# Patient Record
Sex: FEMALE | Race: WHITE
[De-identification: names, ages, dates, MRNs, and addresses within clinical notes are randomized per-mention and may not be internally consistent; named-entity substitution may affect disease eponyms.]

---

## 2017-04-16 ENCOUNTER — HOSPITAL ENCOUNTER (EMERGENCY)
Dept: HOSPITAL 45 - C.EDB | Age: 57
Discharge: HOME | End: 2017-04-16
Payer: COMMERCIAL

## 2017-04-16 VITALS
HEIGHT: 60 IN | WEIGHT: 142.42 LBS | WEIGHT: 142.42 LBS | BODY MASS INDEX: 27.96 KG/M2 | HEIGHT: 60 IN | BODY MASS INDEX: 27.96 KG/M2

## 2017-04-16 VITALS — TEMPERATURE: 98.42 F

## 2017-04-16 VITALS — SYSTOLIC BLOOD PRESSURE: 110 MMHG | HEART RATE: 90 BPM | DIASTOLIC BLOOD PRESSURE: 72 MMHG | OXYGEN SATURATION: 97 %

## 2017-04-16 DIAGNOSIS — Z84.1: ICD-10-CM

## 2017-04-16 DIAGNOSIS — Z88.5: ICD-10-CM

## 2017-04-16 DIAGNOSIS — Z88.8: ICD-10-CM

## 2017-04-16 DIAGNOSIS — Z87.440: ICD-10-CM

## 2017-04-16 DIAGNOSIS — K58.9: ICD-10-CM

## 2017-04-16 DIAGNOSIS — Z79.899: ICD-10-CM

## 2017-04-16 DIAGNOSIS — Z88.2: ICD-10-CM

## 2017-04-16 DIAGNOSIS — R51: Primary | ICD-10-CM

## 2017-04-16 DIAGNOSIS — Z82.49: ICD-10-CM

## 2017-04-16 DIAGNOSIS — K85.90: ICD-10-CM

## 2017-04-16 LAB
ALP SERPL-CCNC: 80 U/L (ref 45–117)
ALT SERPL-CCNC: 41 U/L (ref 12–78)
ANION GAP SERPL CALC-SCNC: 7 MMOL/L (ref 3–11)
APPEARANCE UR: CLEAR
AST SERPL-CCNC: 27 U/L (ref 15–37)
BASOPHILS # BLD: 0.02 K/UL (ref 0–0.2)
BASOPHILS NFR BLD: 0.3 %
BILIRUB UR-MCNC: (no result) MG/DL
BUN SERPL-MCNC: 14 MG/DL (ref 7–18)
BUN/CREAT SERPL: 18.4 (ref 10–20)
CALCIUM SERPL-MCNC: 9.1 MG/DL (ref 8.5–10.1)
CHLORIDE SERPL-SCNC: 105 MMOL/L (ref 98–107)
CO2 SERPL-SCNC: 31 MMOL/L (ref 21–32)
COLOR UR: YELLOW
COMPLETE: YES
CREAT CL PREDICTED SERPL C-G-VRATE: 67.6 ML/MIN
CREAT SERPL-MCNC: 0.77 MG/DL (ref 0.6–1.2)
EOSINOPHIL NFR BLD AUTO: 301 K/UL (ref 130–400)
GLUCOSE SERPL-MCNC: 102 MG/DL (ref 70–99)
HCT VFR BLD CALC: 38.7 % (ref 37–47)
IG%: 0.2 %
IMM GRANULOCYTES NFR BLD AUTO: 28.5 %
LYMPHOCYTES # BLD: 1.77 K/UL (ref 1.2–3.4)
MANUAL MICROSCOPIC REQUIRED?: NO
MCH RBC QN AUTO: 31.4 PG (ref 25–34)
MCHC RBC AUTO-ENTMCNC: 34.9 G/DL (ref 32–36)
MCV RBC AUTO: 90 FL (ref 80–100)
MONOCYTES NFR BLD: 8.2 %
NEUTROPHILS # BLD AUTO: 1.6 %
NEUTROPHILS NFR BLD AUTO: 61.2 %
NITRITE UR QL STRIP: (no result)
PH UR STRIP: 6 [PH] (ref 4.5–7.5)
PMV BLD AUTO: 9.5 FL (ref 7.4–10.4)
POTASSIUM SERPL-SCNC: 4.2 MMOL/L (ref 3.5–5.1)
RBC # BLD AUTO: 4.3 M/UL (ref 4.2–5.4)
REVIEW REQ?: NO
SODIUM SERPL-SCNC: 143 MMOL/L (ref 136–145)
SP GR UR STRIP: 1.02 (ref 1–1.03)
UROBILINOGEN UR-MCNC: (no result) MG/DL
WBC # BLD AUTO: 6.22 K/UL (ref 4.8–10.8)
ZZUR CULT IF INDIC CLEAN CATCH: NO

## 2017-04-16 NOTE — EMERGENCY ROOM VISIT NOTE
History


Report prepared by Jori:  Lily Garrison


Under the Supervision of:  Dr. Jose Carlos Nebwerry D.O.


First contact with patient:  16:49


Chief Complaint:  HEADACHE


Stated Complaint:  ACUTE NAUSEA, MIGRAINE





History of Present Illness


The patient is a 56 year old female who presents to the Emergency Room with 

complaints of constant headache for the past week. The patient has a history of 

abdominal migraines and has been experiencing symptoms for 10+ years. She has 

been experiencing symptoms of her abdominal migraines for the past week. She 

saw both her PCP and her GI doctor, Dr. Kline, this week for her 

symptoms. Dr. Kline prescribed scopolamine patches, which the patient 

tried but states that they did not help and only made her groggy. The patient 

reports nausea and headache. Her headache came on gradually and is located in 

the front of her head. She notes tinnitus. These symptoms are consistent with 

her typical migraine headaches. She rates her pain as an 8/10 in severity. The 

patient denies abdominal pain, vomiting, and changes in vision. She has been 

able to keep food and water down, and ate 2 pieces of toast earlier today. She 

tried taking all of her usual medications without any relief of her symptoms. 

She took Ativan and Zofran PTA today. The patient had a normal bowel movement 

this morning.





   Source of History:  patient


   Onset:  1 week ago


   Position:  head


   Symptom Intensity:  8/10


   Timing:  constant


   Modifying Factors (Relieving):  other (none)


   Associated Symptoms:  + nausea, No abdominal pain, No vomiting


Note:


Pt notes tinnitus.





Review of Systems


See HPI for pertinent positives & negatives. A total of 10 systems reviewed and 

were otherwise negative.





Past Medical & Surgical


Medical Problems:


(1) Bronchitis


(2) Chest pain


(3) IBS (irritable bowel syndrome)


(4) IgA deficiency


(5) Migraine


(6) Pancreatitis


(7) Pancreatitis


(8) Pneumonia


(9) UTI (urinary tract infection)








Family History





FH: heart disease


Kidney disease





Social History


Smoking Status:  Never Smoker


Alcohol Use:  none


Drug Use:  none


Marital Status:  


Housing Status:  lives with family


Occupation Status:  employed





Current/Historical Medications


Scheduled


Amitriptyline HCl (Amitriptyline HCl), 30 MG PO HS


Amoxicillin (Amoxil), 500 MG PO QPM


Cholecalciferol (Vitamin D 1000 Unit), 1,000 INTER.UNIT PO DAILY


Eletriptan (Relpax), 40 MG PO AS DIRECTED


Linaclotide (Linzess), 290 MG PO 3XWK


Metoprolol Succinate (Metoprolol Succinate ER), 25 MG PO DAILY


Multivitamin (Multivitamin), 1 TAB PO DAILY


Pantoprazole (Protonix), 40 MG PO DAILY


Scopolamine (Transderm-Scop), 1 PATCH TOP UD





Scheduled PRN


Azelastine Hcl (Astelin Nasal [place]), 2 SPRAYS LUCHO DAILY PRN for Nasal 

Congestion


Lorazepam (Lorazepam), 1 TAB PO Q8 PRN for Anxiety


Meclizine HCl (Meclizine HCl), 0.5-1 TAB PO TID PRN for Dizziness or Vertigo


Metoclopramide HCl (Metoclopramide HCl), 5 MG PO ACHS PRN for


Mometasone Furoate (Nasal) (Mometasone Furoate), 1 SPRAY LUCHO BID PRN for Nasal 

Congestion





Allergies


Coded Allergies:  


     Metoclopramide (Verified  Allergy, Intermediate, ADVERSE REACTIONS, 2/18/16

)


     Atropine (Verified  Allergy, Unknown, LOMOTIL, 2/18/16)


     Diphenoxylate (Verified  Allergy, Unknown, LOMOTIL, 2/18/16)


     Morphine (Verified  Allergy, Unknown, 7/17/13)


 ANXIOUS/NERVOUS


     Sulfa Drugs (Verified  Allergy, Unknown, BACTRIM-HEPATITIS, 2/18/16)


     Sulfamethoxazole (Verified  Allergy, Unknown, 2/18/16)


     Promethazine (Verified  Adverse Reaction, Mild, ANXIOUS,NERVOUS, 2/18/16)


 ANXIOUS/NERVOUS





Physical Exam


Vital Signs











  Date Time  Temp Pulse Resp B/P Pulse Ox O2 Delivery O2 Flow Rate FiO2


 


4/16/17 19:21  90 16 110/72 97 Room Air  


 


4/16/17 16:46 36.9 93 18 105/71 95 Room Air  





      Ambu-Bag  











Physical Exam


GENERAL: alert, well appearing, sitting up in bed, well nourished, no distress, 

non-toxic 


EYE EXAM: normal conjunctiva, PERRL and EOM's intact


EARS: TMs clear bilaterally


OROPHARYNX: no exudate, no erythema, lips, buccal mucosa, and tongue normal and 

mucous membranes are moist


NECK: supple, no nuchal rigidity, no adenopathy, non-tender


LUNGS: Clear to auscultation. Normal chest wall mechanics


HEART: no murmurs, S1 normal and S2 normal 


ABDOMEN: abdomen soft, non-tender, normo-active bowel sounds, no masses, no 

rebound or guarding. 


BACK: Back is symmetrical on inspection and there is no deformity, no midline 

tenderness, no CVA tenderness. 


SKIN: no rashes and no bruising 


UPPER EXTREMITIES: upper extremities are grossly normal. 


LOWER EXTREMITIES: No pitting edema.


NEURO EXAM: Normal sensorium, cranial nerves II-XII intact, normal speech,  no 

weakness of arms, no weakness of legs. No drift. Finger to nose intact. Gross 

sensation intact.





Medical Decision & Procedures


Laboratory Results


4/16/17 17:12








Red Blood Count 4.30, Mean Corpuscular Volume 90.0, Mean Corpuscular Hemoglobin 

31.4, Mean Corpuscular Hemoglobin Concent 34.9, Mean Platelet Volume 9.5, 

Neutrophils (%) (Auto) 61.2, Lymphocytes (%) (Auto) 28.5, Monocytes (%) (Auto) 

8.2, Eosinophils (%) (Auto) 1.6, Basophils (%) (Auto) 0.3, Neutrophils # (Auto) 

3.81, Lymphocytes # (Auto) 1.77, Monocytes # (Auto) 0.51, Eosinophils # (Auto) 

0.10, Basophils # (Auto) 0.02





4/16/17 17:12

















Test


  4/16/17


17:12 4/16/17


18:30


 


White Blood Count


  6.22 K/uL


(4.8-10.8) 


 


 


Red Blood Count


  4.30 M/uL


(4.2-5.4) 


 


 


Hemoglobin


  13.5 g/dL


(12.0-16.0) 


 


 


Hematocrit 38.7 % (37-47)  


 


Mean Corpuscular Volume


  90.0 fL


() 


 


 


Mean Corpuscular Hemoglobin


  31.4 pg


(25-34) 


 


 


Mean Corpuscular Hemoglobin


Concent 34.9 g/dl


(32-36) 


 


 


Platelet Count


  301 K/uL


(130-400) 


 


 


Mean Platelet Volume


  9.5 fL


(7.4-10.4) 


 


 


Neutrophils (%) (Auto) 61.2 %  


 


Lymphocytes (%) (Auto) 28.5 %  


 


Monocytes (%) (Auto) 8.2 %  


 


Eosinophils (%) (Auto) 1.6 %  


 


Basophils (%) (Auto) 0.3 %  


 


Neutrophils # (Auto)


  3.81 K/uL


(1.4-6.5) 


 


 


Lymphocytes # (Auto)


  1.77 K/uL


(1.2-3.4) 


 


 


Monocytes # (Auto)


  0.51 K/uL


(0.11-0.59) 


 


 


Eosinophils # (Auto)


  0.10 K/uL


(0-0.5) 


 


 


Basophils # (Auto)


  0.02 K/uL


(0-0.2) 


 


 


RDW Standard Deviation


  40.2 fL


(36.4-46.3) 


 


 


RDW Coefficient of Variation


  12.2 %


(11.5-14.5) 


 


 


Immature Granulocyte % (Auto) 0.2 %  


 


Immature Granulocyte # (Auto)


  0.01 K/uL


(0.00-0.02) 


 


 


Anion Gap


  7.0 mmol/L


(3-11) 


 


 


Est Creatinine Clear Calc


Drug Dose 67.6 ml/min 


  


 


 


Estimated GFR (


American) 99.3 


  


 


 


Estimated GFR (Non-


American 85.7 


  


 


 


BUN/Creatinine Ratio 18.4 (10-20)  


 


Calcium Level


  9.1 mg/dl


(8.5-10.1) 


 


 


Total Bilirubin


  0.3 mg/dl


(0.2-1) 


 


 


Direct Bilirubin


  < 0.1 mg/dl


(0-0.2) 


 


 


Aspartate Amino Transf


(AST/SGOT) 27 U/L (15-37) 


  


 


 


Alanine Aminotransferase


(ALT/SGPT) 41 U/L (12-78) 


  


 


 


Alkaline Phosphatase


  80 U/L


() 


 


 


Total Protein


  7.7 gm/dl


(6.4-8.2) 


 


 


Albumin


  4.1 gm/dl


(3.4-5.0) 


 


 


Lipase


  126 U/L


() 


 


 


Urine Color  YELLOW 


 


Urine Appearance  CLEAR (CLEAR) 


 


Urine pH  6.0 (4.5-7.5) 


 


Urine Specific Gravity


  


  1.016


(1.000-1.030)


 


Urine Protein  NEG (NEG) 


 


Urine Glucose (UA)  NEG (NEG) 


 


Urine Ketones  NEG (NEG) 


 


Urine Occult Blood  NEG (NEG) 


 


Urine Nitrite  NEG (NEG) 


 


Urine Bilirubin  NEG (NEG) 


 


Urine Urobilinogen  NEG (NEG) 


 


Urine Leukocyte Esterase  NEG (NEG) 


 


Urine WBC (Auto)  1-5 /hpf (0-5) 


 


Urine RBC (Auto)  0-4 /hpf (0-4) 


 


Urine Hyaline Casts (Auto)  1-5 /lpf (0-5) 


 


Urine Epithelial Cells (Auto)


  


  10-20 /lpf


(0-5)


 


Urine Bacteria (Auto)  NEG (NEG) 


 


Urine Pregnancy Test  NEG (NEG) 





Laboratory results per my review.





Medications Administered











 Medications


  (Trade)  Dose


 Ordered  Sig/Oren


 Route  Start Time


 Stop Time Status Last Admin


Dose Admin


 


 Prochlorperazine


 Edisylate


  (Compazine Inj)  10 mg  NOW  STAT


 IV  4/16/17 16:59


 4/16/17 17:00 DC 4/16/17 17:22


10 MG


 


 Diphenhydramine


 HCl


  (Benadryl Inj)  50 mg  NOW  STAT


 IV  4/16/17 16:59


 4/16/17 17:00 DC 4/16/17 17:22


50 MG


 


 Ketorolac


 Tromethamine 30 mg  30 mg  NOW  STAT


 IV  4/16/17 16:59


 4/16/17 17:00 DC 4/16/17 17:23


30 MG


 


 Sodium Chloride


  (Nss 1000ml)  2,000 ml @ 


 999 mls/hr  Q2H1M STAT


 IV  4/16/17 17:00


 4/16/17 19:00 DC 4/16/17 17:21


999 MLS/HR


 


 Diphenhydramine


 HCl


  (Benadryl Inj)  25 mg  NOW  STAT


 IV  4/16/17 17:52


 4/16/17 17:53 DC 4/16/17 17:55


25 MG


 


 Hydromorphone HCl


  (Dilaudid Inj)  0.5 mg  NOW  STAT


 IV  4/16/17 19:05


 4/16/17 19:06 DC 4/16/17 19:20


0.5 MG











ED Course


ED COURSE: 


Vital signs were reviewed and showed normal vitals


The patients medical record was reviewed


The above diagnostic studies were performed and reviewed.


ED treatments and interventions as stated above. 





1650: The patient was evaluated in room B7. A complete history and physical 

examination was performed.





1659: Toradol 30 mg IV, Benadryl 50 mg IV, Compazine 10 mg IV





1700: NSS 2000 ml @ 999 mls/hr IV





1746: I reassessed the patient. She got the Compazine before she got the 

Benadryl, so she is feeling very shaky. 





1752: Benadryl 25 mg IV





1902: Upon reevaluation, the patient is feeling better. I discussed my findings 

with the patient and she understands and agrees with the treatment plan.   


Based on the patients age, coexisting illnesses, exam and lab findings the 

decision to treat as an outpatient was made.


The patient remained stable while under my care.


The patient appeared well at the time of discharge.





1905: Dilaudid 0.5 mg IV





Medical Decision


Differential Diagnosis includes but is not limited to headache, tension headache

, cluster headache, migraine, subarachnoid hemorrhage, meningitis, mass, 

central venous thrombus, concussion, trauma and epidural/subdural hemorrhage.





Patient is a 56-year-old female who presents the ER for her typical abdominal 

migraine.  It's associated with a headache and nausea.  The headaches unchanged 

in anyway shape or form from her typical headaches.  She is completely 

neurologically intact.  Headache came on gradually and progressively worsened.  

Nothing to suggest a subarachnoid bleed and no trauma.  No fevers to suggest 

infection.  No signs of meningitis or encephalitis on exam.  CBC along with BMP

, LFTs, bilirubin and lipase is unremarkable.  UA was negative.  Pregnancy was 

negative.  She was given IV Compazine prior to Benadryl and she became very 

anxious.  She was given additional dose of Benadryl and felt significantly 

better.  She was also given Dilaudid and her headache did improve.  Patient was 

given 2 L normal saline and was discharged improved to follow-up with her 

primary care doctor. Discussed with Pt concerning signs and symptoms to watch 

out for. Pt was instructed to follow up with their PCP and discussed with the 

patient their option to return to the ED at anytime for persistent or worsening 

symptoms. The appropriate anticipatory guidance and out-patient management, 

including indications for return to the emergency department, were explained at 

length to the patient and understood.





Impression





 Primary Impression:  


 Headache





Scribe Attestation


The scribe's documentation has been prepared under my direction and personally 

reviewed by me in its entirety. I confirm that the note above accurately 

reflects all work, treatment, procedures, and medical decision making performed 

by me.





Departure Information


Dispostion


Home / Self-Care





Referrals


Brionna Barcenas DO (PCP)





Forms


HOME CARE DOCUMENTATION FORM,                                                 

               IMPORTANT VISIT INFORMATION





Patient Instructions


Headache Pain, My [facility]





Additional Instructions





Please follow up with your primary care doctor with in the next 24 hours.  Any 

worsening of your symptoms, please return to the ED immediately.  This includes 

worsening headache, change in vision, weakness in the arms or legs, or any 

other concerning signs or symptoms from your standpoint.





Please do not drive, work, operate heavy machinery for the next 12 hours.





Problem Qualifiers








 Primary Impression:  


 Headache


 Headache type:  unspecified  Headache chronicity pattern:  unspecified pattern

  Intractability:  not intractable  Qualified Codes:  R51 - Headache

## 2017-06-19 ENCOUNTER — HOSPITAL ENCOUNTER (OUTPATIENT)
Dept: HOSPITAL 45 - C.PAPS | Age: 57
Discharge: HOME | End: 2017-06-19
Attending: OBSTETRICS & GYNECOLOGY
Payer: COMMERCIAL

## 2017-06-19 DIAGNOSIS — Z01.419: Primary | ICD-10-CM

## 2017-07-05 ENCOUNTER — HOSPITAL ENCOUNTER (OUTPATIENT)
Dept: HOSPITAL 45 - C.MAMM | Age: 57
Discharge: HOME | End: 2017-07-05
Attending: OBSTETRICS & GYNECOLOGY
Payer: COMMERCIAL

## 2017-07-05 DIAGNOSIS — Z12.31: Primary | ICD-10-CM

## 2017-07-05 NOTE — MAMMOGRAPHY REPORT
BILATERAL DIGITAL SCREENING MAMMOGRAM TOMOSYNTHESIS WITH CAD: 7/5/2017

CLINICAL HISTORY: Routine screening.  Patient has no complaints.  





TECHNIQUE:  Breast tomosynthesis in addition to standard 2D mammography was performed. Current study 
was also evaluated with a Computer Aided Detection (CAD) system.  



COMPARISON: Comparison is made to exams dated:  6/30/2016 mammogram, 6/29/2015 mammogram, 6/25/2014 m
ammogram, 6/27/2013 mammogram, 6/21/2013 mammogram, and 5/16/2012 mammogram - [facility]
enter.   



BREAST COMPOSITION:  The tissue of both breasts is heterogeneously dense, which may obscure small mas
ses.  



FINDINGS:  There is a 5 mm nodular asymmetry with associated microcalcification in the superior poste
rior left breast, best seen on the MLO view, but thought to project laterally based on the tomosynthe
sis localizer bar (best seen on MLO slice 32).  Additional spot magnification views and a possible ul
trasound are recommended.  Spot magnification in the left exaggerated CC positioning may be useful.



There are scattered stable benign coarse and rodlike calcifications elsewhere in the breasts.  No oth
er new suspicious mass, architectural distortion or cluster of microcalcifications is seen.  



IMPRESSION:  ACR BI-RADS CATEGORY 0: INCOMPLETE EVALUATION:  NEED ADDITIONAL IMAGING EVALUATION

The 5 mm asymmetry with associated clustered microcalcification in the superior posterior left breast
 needs additional evaluation.  



The patient will be called to schedule an appointment.  





Approximately 10% of breast cancers are not detected with mammography. A negative mammographic report
 should not delay biopsy if a clinically suggestive mass is present.



Lynne Alcantara M.D.          

ay/:7/5/2017 08:58:20  



Imaging Technologist: Susana Casey, [facility]

letter sent: Addl Imaging 0  

BI-RADS Code: ACR BI-RADS Category 0: Incomplete Evaluation:  Need Additional Imaging Evaluation

## 2017-07-13 ENCOUNTER — HOSPITAL ENCOUNTER (OUTPATIENT)
Dept: HOSPITAL 45 - C.MAMM | Age: 57
Discharge: HOME | End: 2017-07-13
Attending: OBSTETRICS & GYNECOLOGY
Payer: COMMERCIAL

## 2017-07-13 DIAGNOSIS — N64.89: Primary | ICD-10-CM

## 2017-07-13 DIAGNOSIS — R92.1: ICD-10-CM

## 2017-07-13 NOTE — MAMMOGRAPHY REPORT
UNILATERAL LEFT DIGITAL DIAGNOSTIC MAMMOGRAM TOMOSYNTHESIS: 7/13/2017

CLINICAL HISTORY: Callback from screening mammogram for left breast asymmetry and associated calcific
ations.  





TECHNIQUE:  Breast tomosynthesis in addition to standard 2D mammography was performed.  Spot magnific
ation left CC and ML views and spot compression left MLO 2-D and tomosynthesis images were obtained.



COMPARISON: Comparison is made to exams dated:  7/5/2017 mammogram, 6/30/2016 mammogram, 6/25/2014 ma
mmogram, 6/29/2015 mammogram, 6/27/2013 mammogram, and 6/21/2013 mammogram - [facility]
nter.   



BREAST COMPOSITION:  The tissue of the left breast is heterogeneously dense, which may obscure small 
masses.  



FINDINGS: Spot magnification views of the left breast demonstrate a small cluster of calcifications i
n the left upper outer quadrant, measuring approximately 4 mm on the ML view.  On the spot magnificat
ion view, the calcifications appear stable compared to multiple prior exams including the 2014 and 20
13 exams and likely also the May 2012 exam.  The calcifications also demonstrate layering on the late
ral view, suggestive of benign milk of calcium.  The asymmetry associated with the calcifications als
o appears stable to the prior exams including the 2013 and 2012 exams.  Given the long-term stability
, the findings are considered benign.



IMPRESSION:  ACR BI-RADS CATEGORY 2: BENIGN

Small cluster of calcifications and associated asymmetry in the left upper outer quadrant are stable 
dating back to the 2013 and 2012 exams, and considered benign given long-term stability.  There is no
 mammographic evidence of malignancy. A 1 year screening mammogram is recommended.  The patient has b
een verbally notified of the results.  





Approximately 10% of breast cancers are not detected with mammography. A negative mammographic report
 should not delay biopsy if a clinically suggestive mass is present.



Ina Archer M.D.          

/:7/13/2017 09:50:30  



Imaging Technologist: Renate TORO)(ISA), [facility]

letter sent: Normal 1/2  

BI-RADS Code: ACR BI-RADS Category 2: Benign

## 2018-08-17 ENCOUNTER — HOSPITAL ENCOUNTER (OUTPATIENT)
Dept: HOSPITAL 45 - C.MAMM | Age: 58
Discharge: HOME | End: 2018-08-17
Attending: OBSTETRICS & GYNECOLOGY
Payer: COMMERCIAL

## 2018-08-17 DIAGNOSIS — Z12.31: Primary | ICD-10-CM

## 2018-08-17 NOTE — MAMMOGRAPHY REPORT
BILATERAL DIGITAL SCREENING MAMMOGRAM TOMOSYNTHESIS WITH CAD: 8/17/2018

CLINICAL HISTORY: Routine screening.  





TECHNIQUE: The study was acquired using full field digital technology and interpreted from soft copy.
 Breast tomosynthesis in addition to standard 2D mammography was performed. Current study was also ev
aluated with a Computer Aided Detection (CAD) system.  



COMPARISON: Comparison is made to exams dated:  7/13/2017 mammogram, 7/5/2017 mammogram, 6/30/2016 ma
mmogram, 6/29/2015 mammogram, 6/25/2014 mammogram, and 6/21/2013 mammogram - [facility]
nter.   

BREAST COMPOSITION: The tissue of both breasts is heterogeneously dense, which may obscure small mass
es.  



FINDINGS: 

No suspicious masses, calcifications, or areas of architectural distortion are noted in either breast
. There has been no significant interval change compared to prior exams.  Scattered bilateral benign-
appearing calcifications are again noted.





IMPRESSION: ACR BI-RADS CATEGORY 2: BENIGN

There is no mammographic evidence of malignancy. A 1 year screening mammogram is recommended.(08/18/2
019)  The patient will receive written notification of the results.  





Some breast cancers are not detected with mammography. A negative mammographic report should not selina
y biopsy if a clinically suggestive mass is present.



Ina Archer M.D.          

ah/:8/17/2018 15:15:44  



Imaging Technologist: RT Haim(JESSE)(M), [facility]

letter sent: Normal 1/2  

BI-RADS Code: ACR BI-RADS Category 2: Benign